# Patient Record
Sex: FEMALE | Race: WHITE
[De-identification: names, ages, dates, MRNs, and addresses within clinical notes are randomized per-mention and may not be internally consistent; named-entity substitution may affect disease eponyms.]

---

## 2019-12-11 ENCOUNTER — HOSPITAL ENCOUNTER (INPATIENT)
Dept: HOSPITAL 76 - ED | Age: 34
LOS: 2 days | Discharge: HOME | DRG: 138 | End: 2019-12-13
Attending: INTERNAL MEDICINE | Admitting: SPECIALIST
Payer: SELF-PAY

## 2019-12-11 DIAGNOSIS — R25.2: ICD-10-CM

## 2019-12-11 DIAGNOSIS — K04.7: ICD-10-CM

## 2019-12-11 DIAGNOSIS — Z97.5: ICD-10-CM

## 2019-12-11 DIAGNOSIS — K12.2: Primary | ICD-10-CM

## 2019-12-11 DIAGNOSIS — Z87.891: ICD-10-CM

## 2019-12-11 DIAGNOSIS — R59.0: ICD-10-CM

## 2019-12-11 DIAGNOSIS — K04.1: ICD-10-CM

## 2019-12-11 LAB
ANION GAP SERPL CALCULATED.4IONS-SCNC: 13 MMOL/L (ref 6–13)
BASOPHILS NFR BLD AUTO: 0.1 10^3/UL (ref 0–0.1)
BASOPHILS NFR BLD AUTO: 0.4 %
BUN SERPL-MCNC: 9 MG/DL (ref 6–20)
CALCIUM UR-MCNC: 9.2 MG/DL (ref 8.5–10.3)
CHLORIDE SERPL-SCNC: 102 MMOL/L (ref 101–111)
CO2 SERPL-SCNC: 23 MMOL/L (ref 21–32)
CREAT SERPLBLD-SCNC: 0.6 MG/DL (ref 0.4–1)
EOSINOPHIL # BLD AUTO: 0.1 10^3/UL (ref 0–0.7)
EOSINOPHIL NFR BLD AUTO: 0.3 %
ERYTHROCYTE [DISTWIDTH] IN BLOOD BY AUTOMATED COUNT: 12.1 % (ref 12–15)
GFRSERPLBLD MDRD-ARVRAT: 115 ML/MIN/{1.73_M2} (ref 89–?)
GLUCOSE SERPL-MCNC: 90 MG/DL (ref 70–100)
HCG UR QL: NEGATIVE
HGB UR QL STRIP: 13.6 G/DL (ref 12–16)
LYMPHOCYTES # SPEC AUTO: 1.7 10^3/UL (ref 1.5–3.5)
LYMPHOCYTES NFR BLD AUTO: 10.3 %
MCH RBC QN AUTO: 32.3 PG (ref 27–31)
MCHC RBC AUTO-ENTMCNC: 33.4 G/DL (ref 32–36)
MCV RBC AUTO: 96.7 FL (ref 81–99)
MONOCYTES # BLD AUTO: 1 10^3/UL (ref 0–1)
MONOCYTES NFR BLD AUTO: 6.1 %
NEUTROPHILS # BLD AUTO: 13.2 10^3/UL (ref 1.5–6.6)
NEUTROPHILS # SNV AUTO: 16 X10^3/UL (ref 4.8–10.8)
NEUTROPHILS NFR BLD AUTO: 82.2 %
PDW BLD AUTO: 10 FL (ref 7.9–10.8)
PLATELET # BLD: 298 10^3/UL (ref 130–450)
RBC MAR: 4.21 10^6/UL (ref 4.2–5.4)
SODIUM SERPLBLD-SCNC: 138 MMOL/L (ref 135–145)
SP GR UR STRIP.AUTO: 1.02 (ref 1–1.03)

## 2019-12-11 PROCEDURE — 70491 CT SOFT TISSUE NECK W/DYE: CPT

## 2019-12-11 PROCEDURE — 0CDXXZ0 EXTRACTION OF LOWER TOOTH, SINGLE, EXTERNAL APPROACH: ICD-10-PCS | Performed by: DENTIST

## 2019-12-11 PROCEDURE — 81025 URINE PREGNANCY TEST: CPT

## 2019-12-11 PROCEDURE — 96365 THER/PROPH/DIAG IV INF INIT: CPT

## 2019-12-11 PROCEDURE — 0J910ZZ DRAINAGE OF FACE SUBCUTANEOUS TISSUE AND FASCIA, OPEN APPROACH: ICD-10-PCS | Performed by: DENTIST

## 2019-12-11 PROCEDURE — 99285 EMERGENCY DEPT VISIT HI MDM: CPT

## 2019-12-11 PROCEDURE — 96375 TX/PRO/DX INJ NEW DRUG ADDON: CPT

## 2019-12-11 PROCEDURE — 87070 CULTURE OTHR SPECIMN AEROBIC: CPT

## 2019-12-11 PROCEDURE — 85025 COMPLETE CBC W/AUTO DIFF WBC: CPT

## 2019-12-11 PROCEDURE — 87205 SMEAR GRAM STAIN: CPT

## 2019-12-11 PROCEDURE — 36415 COLL VENOUS BLD VENIPUNCTURE: CPT

## 2019-12-11 PROCEDURE — 80048 BASIC METABOLIC PNL TOTAL CA: CPT

## 2019-12-11 PROCEDURE — 99284 EMERGENCY DEPT VISIT MOD MDM: CPT

## 2019-12-11 RX ADMIN — ACETAMINOPHEN PRN MG: 325 TABLET ORAL at 20:47

## 2019-12-11 RX ADMIN — MORPHINE SULFATE PRN MG: 2 INJECTION, SOLUTION INTRAMUSCULAR; INTRAVENOUS at 19:03

## 2019-12-11 RX ADMIN — SODIUM CHLORIDE SCH MLS/HR: 9 INJECTION, SOLUTION INTRAVENOUS at 23:17

## 2019-12-11 RX ADMIN — SODIUM CHLORIDE SCH MLS/HR: 9 INJECTION, SOLUTION INTRAVENOUS at 19:04

## 2019-12-11 RX ADMIN — SODIUM CHLORIDE SCH: 9 INJECTION, SOLUTION INTRAVENOUS at 22:27

## 2019-12-11 RX ADMIN — SODIUM CHLORIDE, PRESERVATIVE FREE SCH ML: 5 INJECTION INTRAVENOUS at 19:03

## 2019-12-11 NOTE — ED PHYSICIAN DOCUMENTATION
PD HPI HEENT





- Stated complaint


Stated Complaint: MOUTH PX/SENT BY 





- Chief complaint


Chief Complaint: Heent





- History obtained from


History obtained from: Patient





- History of Present Illness


Timing - onset: How many days ago (2-3)


Timing - duration: Days (2-3)


Timing - details: Abrupt onset


Severity Comments: moderate to severe 


Location: Other (Right facial swelling, mouth pain, dental pain)


Improves: Nothing


Worsens: Other (palpation, opening her mouth)


Associated symptoms: Trismus, Facial swelling.  No: Fever, Congestion


Similar symptoms before: Has not had sx before


Recently seen: Clinic (was seen in ENT clinic this morning and referred to the 

ED for labs, IV, and CT imaging evaluation)





- Treatment prior to arrival


Treatment prior to arrival: 





tylenol earlier today





Review of Systems


Ten Systems: 10 systems reviewed and negative


Constitutional: denies: Fever, Chills


Ears: reports: Reviewed and negative


Nose: reports: Reviewed and negative


Throat: reports: Dental pain / toothache


GI: denies: Nausea, Vomiting


Skin: reports: Reviewed and negative


Musculoskeletal: denies: Neck pain


Neurologic: reports: Reviewed and negative.  denies: Headache


Psychiatric: reports: Reviewed and negative


Endocrine: reports: Reviewed and negative


Immunocompromised: reports: Reviewed and negative





PD PAST MEDICAL HISTORY





- Past Medical History


Past Medical History: No





- Allergies


Allergies/Adverse Reactions: 


                                    Allergies











Allergy/AdvReac Type Severity Reaction Status Date / Time


 


No Known Drug Allergies Allergy   Verified 12/11/19 14:14














PD ED PE NORMAL





- Vitals


Vital signs reviewed: Yes





- General


General: Alert and oriented X 3, No acute distress, Well developed/nourished





- HEENT


HEENT: Atraumatic





- Cardiac


Cardiac: RRR





- Respiratory


Respiratory: No respiratory distress





- Abdomen


Abdomen: Soft, Non distended





- Female 


Female : Deferred





- Rectal


Rectal: Deferred





- Derm


Derm: Normal color, Warm and dry, No rash





- Neuro


Neuro: Alert and oriented X 3


Eye Opening: Spontaneous


Motor: Obeys Commands


Verbal: Oriented


GCS Score: 15





- Psych


Psych: Normal mood, Normal affect





PD ED PE EXPANDED





- HEENT


HEENT: Other (Large amount of R jaw line swelling, mucous membranes moist, 

trismus present, unable to visualize inside of mouth. The sweling is firm and 

tender. no erythema)





- Neck


Neck: Other (Right submandibular swelling and tenderness that is moderate to 

severe)





Results





- Vitals


Vitals: 


                               Vital Signs - 24 hr











  12/11/19 12/11/19





  14:14 15:59


 


Temperature 37.2 C 


 


Heart Rate 88 80


 


Respiratory 18 18





Rate  


 


Blood Pressure 141/75 H 133/71 H


 


O2 Saturation 100 100








                                     Oxygen











O2 Source                      Room air

















- Labs


Labs: 


                                Laboratory Tests











  12/11/19 12/11/19





  14:14 14:14


 


WBC  16.0 H 


 


RBC  4.21 


 


Hgb  13.6 


 


Hct  40.7 


 


MCV  96.7 


 


MCH  32.3 H 


 


MCHC  33.4 


 


RDW  12.1 


 


Plt Count  298 


 


MPV  10.0 


 


Neut # (Auto)  13.2 H 


 


Lymph # (Auto)  1.7 


 


Mono # (Auto)  1.0 


 


Eos # (Auto)  0.1 


 


Baso # (Auto)  0.1 


 


Absolute Nucleated RBC  0.00 


 


Nucleated RBC %  0.0 


 


Sodium   138


 


Potassium   3.6


 


Chloride   102


 


Carbon Dioxide   23


 


Anion Gap   13.0


 


BUN   9


 


Creatinine   0.6


 


Estimated GFR (MDRD)   115


 


Glucose   90


 


Calcium   9.2














PD MEDICAL DECISION MAKING





- ED course


Complexity details: reviewed results, re-evaluated patient, considered 

differential, d/w patient, d/w consultant


ED course: 


ddx- submandibular abscess, treasure's angina, dental abscess





33 y/o  F with dental pain and worsening facial swelling for 2-3 days, afebrile 

but with obvious facial swelling and trismus.


Sent in to the ED by Dr. Mosher who will take her to the OR today for management. 


Obtained labs, CT scan and given antibiotics. 


pt does have a [id number] mm R submandibular abscess with a periapical mandibular 

tooth abscess needing operative management. 


SHe is currently maintaining her airway and Dr. Mosher is on the way into the ED 

to take her to the OR thus will hold off on intubation for OR unless she has 

worsening clinical symptoms. 


Hospitalist. will admit the pt to medicine with Dr. Mosher managing her 

surgically.





Departure





- Departure


Disposition: [facility] DC/Xfer


Clinical Impression: 


 Abscess or cellulitis of submandibular region





Condition: Serious


Record reviewed to determine appropriate education?: Yes

## 2019-12-11 NOTE — CT REPORT
Reason:  submanidublar abscess

Procedure Date:  12/11/2019   

Accession Number:  [id number] / [id number]                    

Procedure:  CT  - SOFT TISSUE NECK W CPT Code:  

 

***Final Report***

 

 

FULL RESULT:

 

 

EXAM:

CT SOFT TISSUE NECK WITH CONTRAST.

 

EXAM DATE: 12/11/2019 03:43 PM.

 

HISTORY: Right submandibular swelling. Concern for mass or abscess.

 

COMPARISONS: None.

 

TECHNIQUE: Routine soft tissue neck CT protocol with contrast. 

Reconstructions: Coronal and sagittal. IV contrast: Optiray 320, 80 mL.

 

In accordance with CT protocol optimization, one or more of the following 

dose reduction techniques were utilized for this exam: automated exposure 

control, adjustment of mA and/or KV based on patient size, or use of 

iterative reconstructive technique.

 

FINDINGS:

Visualized Intracranial Contents: Unremarkable.

 

Orbits: Symmetric and unremarkable.

 

Sinuses: Visualized paranasal sinuses and mastoid air cells are clear.

 

Oral cavity: The visualized oral cavity is unremarkable. Mild effacement 

of the right base of the tongue is seen secondary to submandibular space 

abscess and surrounding inflammation. No intrinsic tongue density 

abnormality is appreciated.

 

Pharynx: Mild effacement of the right vallecula is seen. Otherwise 

pharyngeal mucosa is unremarkable. The  spaces and 

pterygopalatine fossa are unremarkable. The infratemporal fossa, 

parapharyngeal spaces, and retropharyngeal space are unremarkable. The 

base of the tongue is symmetric and unremarkable. The airway is patent.

 

Larynx: Larynx and supraglottic airway are patent without mass lesion. 

Vocal cords are symmetric. The visualized trachea is unremarkable.

 

Parotid and Submandibular Glands: Extrinsic mass effect with inferior and 

anteromedial displacement of the right submandibular gland is 

appreciated. No definite intrinsic signal abnormality is seen. No 

calculus is evident.

The left submandibular gland and bilateral parotid glands are 

unremarkable.

 

Lymph Nodes: Enlarged right level I and level II lymph nodes are present. 

No hypodensity is seen to suggest lymph node abscess.

 

Soft Tissues: Rim-enhancing hypodense collection consistent with abscess 

is seen along the inferior margin of the posterior body and angle of the 

mandible. This measures 15 x 28 x 24 mm. There is adjacent periapical 

abscess involving right mandibular posterior tricuspid tooth. Medial 

cortical erosion is present. Caries is seen within this tooth as well. 

Surrounding fascial plane edema is seen centered in the right 

submandibular space. Lateral displacement and thickening of the right 

platysma muscle is seen. Mild fascial plane edema is seen superficial to 

the right masseter muscle and inferior parotid gland. Mild fascial plane 

edema extends inferiorly into the anterior cervical space.

 

Vascular Structures: Patent and unremarkable.

 

Thyroid Gland: Normal.

 

Lung: The visualized lung apices are clear.

 

Bones: Mild kyphosis of the cervical spine is noted. No lytic or blastic 

bony lesions are seen. Minimal spondylosis with dorsal disk bulge is seen 

at C4-C5 and C5-C6.

 

Other: None.

IMPRESSION:

1. Periapical abscess involving right mandibular posterior tricuspid 

tooth. Medial cortical erosion is seen. Adjacent abscess is present 

inferior to the angle of the mandible measuring 15 x 28 x 24 mm. 

Surrounding fascial plane edema and mass effect is present.

 

2. Reactive lymphadenopathy is seen involving right level I and level II 

lymph nodes. No lymph node abscess is seen.

 

RADIA

 

The call report notification system was initiated by Dr. Jayson Perez at 

04:14 PM on 12/11/2019.

 

The above call report findings  were discussed with Romana Dorsey by Dr. Jayson Perez at 04:22 PM on 12/11/2019.

## 2019-12-11 NOTE — ANESTHESIA
Pre-Anesthesia VS, & Labs





- Diagnosis





R jaw swelling abcess





- Procedure





R Jaw I&D


Vital Signs: 





                                        











Temp Pulse Resp BP Pulse Ox


 


 37.2 C   80   18   133/71 H  100 


 


 12/11/19 14:14  12/11/19 15:59  12/11/19 15:59  12/11/19 15:59  12/11/19 15:59














                                        





Height                           5 ft 7 in


Weight (kg)                      69.853 kg


Body Mass Index                  24.1











- NPO


>8 hours





- Pregnancy


Is Patient Pregnant?: Waiver signed





- Lab Results


Current Lab Results: 





Laboratory Tests





12/11/19 14:14: Sodium 138, Potassium 3.6, Chloride 102, Carbon Dioxide 23, 

Anion Gap 13.0, BUN 9, Creatinine 0.6, Estimated GFR (MDRD) 115, Glucose 90, 

Calcium 9.2


12/11/19 14:14: WBC 16.0 H, RBC 4.21, Hgb 13.6, Hct 40.7, MCV 96.7, MCH 32.3 H, 

MCHC 33.4, RDW 12.1, Plt Count 298, MPV 10.0, Neut # (Auto) 13.2 H, Lymph # 

(Auto) 1.7, Mono # (Auto) 1.0, Eos # (Auto) 0.1, Baso # (Auto) 0.1, Absolute 

Nucleated RBC 0.00, Nucleated RBC % 0.0








Lab results reviewed: Yes


Fish Bones: 


                                 12/11/19 14:14





                                 12/11/19 14:14





Home Medications and Allergies





Active Medications





Sodium Chloride (Normal Saline 0.9%)  1,000 mls @ 100 mls/hr IV .Q10H UTE


Ampicillin Sodium/Sulbactam (Sodium 3 gm/ Sodium Chloride)  100 mls @ 200 mls/hr

IV Q6HR UTE


Morphine Sulfate (Morphine (Carpuject))  2 mg IVP Q2HR PRN


   PRN Reason: Pain 8 to 10


Ondansetron HCl (Zofran Inj)  4 mg IVP Q6HR PRN


   PRN Reason: Nausea / Vomiting


Ondansetron HCl (Zofran Odt)  4 mg TL Q6HR PRN


   PRN Reason: Nausea / Vomiting


Sodium Chloride (Normal Saline Flush 0.9%)  10 ml IVP PRN PRN


   PRN Reason: AS NEEDED PER PROVIDER ORDERS


Sodium Chloride (Normal Saline Flush 0.9%)  10 ml IVP 0100,0900,1700 [facility]








Allergies/Adverse Reactions: 


                                    Allergies











Allergy/AdvReac Type Severity Reaction Status Date / Time


 


No Known Drug Allergies Allergy   Verified 12/11/19 14:14














Anes History & Medical History





- Anesthetic History


Anesthesia Complications: reports: No previous complications


Family history of Anesthesia Complications: Denies


Family history of Malignant Hyperthermia: Denies





- Medical History


Cardiovascular: reports: None


Pulmonary: reports: None


Gastrointestinal: reports: None


Urinary: reports: None


Neuro: reports: None


Musculoskeletal: reports: None


Endocrine/Autoimmune: reports: None


Blood Disorders: reports: None


Smoking Status: Never smoker


Psychosocial: reports: Cannabis





- Surgical History


Orthopedic: Other (wrist)





Exam


General: Alert, Oriented x3, Cooperative


Dental: WNL (#7 at front chipped)


Neck Mobility: Normal


Mallampati classification: III (normally no difficulty with mouth opening.)


Thyromental Distance: 4-6 cm


Respiratory: Lungs clear, Normal breath sounds


Cardiovascular: Regular rate


Neurological: Normal speech


Mental/Cognitive Status: Alert/Oriented X3, Normal for patient


Cognitive Status: Within normal limits





Plan


Anesthesia Type: General


Consent for Procedure(s) Verified and Reviewed: Yes


Code Status: Attempt Resuscitation


ASA classification: 2-Mild systemic disease


Is this case an emergency?: Yes

## 2019-12-11 NOTE — HISTORY & PHYSICAL EXAMINATION
Chief Complaint





- Chief Complaint


Chief Complaint: right jaw pain/swelling





History of Present Illness





- Admitted From


Admitted From:: Home/ER





- History Obtained From


Records Reviewed: [facility]


History obtained from: patient, mom


Exam Limitations: none





- History of Present Illness


HPI Comment/Other: 


She is a 34-year-old white female who has no major medical illnesses the 

presents to our emergency room because of jaw pain and swelling.She says that 

she has this intermittently because of a wisdom tooth on the right side of her 

jaw that will have growth spurts.  Usually pain is transitory, fleeting, and 

does not last for very long.  And only her gum will get swollen.





This time the swelling has extended into her jaw and into her face and down her 

neck and it is scaring her.She went to go see the dentist, who referred her to 

the emergency room for imaging studies.  She has been identified as having a 

tooth abscess extending into the jaw.On review of systems she has no problems 

with chest pain, previous history of valvular heart disease, arrhythmias, 

pneumonias or bronchitis, urgency, frequency, dysuria.She does not take any 

medication on a regular basis.She has an IUD for birth control.





She does not have a fever but white cell count is 16,000.Soft tissue neck CT 

shows apparent a couple abscess involving the right mandibular posterior 

tricuspid tooth.  Medial cortical erosion seen with adjacent abscess present to 

the inferior of the angle of the mandible measuring 15 x 28 x 24 mm.  

Surrounding fascial plane edema and mass-effect is present.











History





- Past Medical History


Cardiovascular: reports: None


Respiratory: reports: None


Neuro: reports: None


Endocrine/Autoimmune: reports: None


GI: reports: None


GYN: reports: Other (, has IUD)


: reports: None


HEENT: reports: Chronic vision loss


Psych: reports: Anxiety (that she treats w yoga, meditation, eating well, 

sleeping well and occ cannibus)


Musculoskeletal: reports: None


MRSA Hx?: No





- Past Surgical History


Ortho: reports: Other (wrist surgery for extra cartilage)





- Family & Social History


Family History Comment/Other: Mom is 63 and completely healthy without any high 

blood pressure, diabetes, thyroid disease, bleeding disorder, cancer.  Dad is 57

years and also completely healthy.  1 sister is completely healthy.  Never had 

children


Living arrangement: At home


Living Situation: Alone


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  She is

been  once and  in the last 5 years.She used to smoke 3 

cigarettes a day And stopped 5 years ago After a 10-year history.She drinks 

about 2-3 beers on Friday and Saturday. She does cannabis here and there.No 

history of other recreational substance abuse.Last use of smoking cannibus 3 

days ago.





- Substance History


Use: Uses substance without health or social issues: Cannabis


Abuse: Recurrent use of substance despite neg consequences: NONE


Dependence: Experiences withdrawal or developed tolerances: NONE





Meds/Allgy





- Allergies


Allergies/Adverse Reactions: 


                                    Allergies











Allergy/AdvReac Type Severity Reaction Status Date / Time


 


No Known Drug Allergies Allergy   Verified 19 14:14














Review of Systems





- Constitutional


Constitutional: reports: Poor appetite.  denies: Fatigue, Fever, Chills, Malaise





- Eyes


Eyes: denies: Pain, Irritation, Amaurosis, Blurred vision, Field loss, Vision 

loss





- Ears, Nose & Throat


Ears, Nose & Throat: reports: Ear pain (right), Sore throat (right), Bleeding 

gums (right), Dental decay (right), Dental pain (right), Other (swelling of face

and neck for 2-3 days)





- Cardiovascular


Cariovascular: denies: Irregular heart rate, Palpitations, Chest pain, Edema, 

Exertional dyspnea, Decr. exercise tolerance





- Respiratory


Respiratory: denies: Cough, Sputum production, Wheezing, Hemoptysis, Orthopnea, 

SOB at rest, SOB with exertion





- Gastrointestinal


Gastrointestinal: denies: Abdominal pain, Abdominal distention, Constipation, Di

arrhea, Change in bowel habits





- Genitourinary


Genitourinary: denies: Dysuria, Frequency, Urgency, Hematuria





- Musculoskeletal


Musculoskeletal: denies: Muscle pain, Back pain, Muscle aches, Stiffness





- Integumentary


Integumentary: denies: Rash, Pruritis, Acne





- Neurological


Neurological: denies: General weakness, Focal weakness, Headache, Dizziness





- Psychiatric


Psychiatric: denies: Depression, Anxiety, Suicidal





- Endocrine


Endocrine: denies: Polyuria, Polydypsia, Polyphagia, Intolerance to cold





- Hematologic/Lymphatic


Hematologic/Lymphatic: denies: Anemia, Bruising, Petechiae, Bleeding tendencies


Prior Level of Functionality: 





completely dependent with living alone, driving, paying bills, engaged to be 

, full time employed.





Exam





- Vital Signs


Reviewed Vital Signs: Yes


Vital Signs: 





                                Vital Signs x48h











  Temp Pulse Resp BP Pulse Ox


 


 19 15:59   80  18  133/71 H  100


 


 19 14:14  37.2 C  88  18  141/75 H  100














- Physical Exam


General Appearance: positive: Alert, Moderate distress, Other (mom is at the 

bedside)


Eyes Bilateral: positive: PERRL, EOMI


ENT: positive: Other (right gum line along back molar turgid, painful w edema of

cheek on that side)


Neck: positive: No JVD, Trachea midline, Swelling/bruising (starts along right 

TMJ, extends down neck with some redness of skin), Other (no stridor, no 

drooling)


Respiratory: positive: Chest non-tender, No respiratory distress, Other (no 

stridor).  negative: Wheezes, Rales, Rhonchi


Cardiovascular: positive: Regular rate & rhythm.  negative: Systolic murmur, 

Gallop/S4, Friction rub


Peripheral Pulses: positive: 1+


Abdomen: positive: Non-tender, No organomegaly, Nml bowel sounds, No distention


Skin: positive: Warm, Dry.  negative: Diaphoresis, Pallor


Extremities: positive: Non-tender, Full ROM.  negative: Nml appearance (right w

rist has scar and slight deformit at base of thumb)


Neurologic/Psychiatric: positive: Oriented x3, CN's nml (2-12), Motor nml, 

Sensation nml





Conclusion/Plan





- Problem List


(1) Abscess or cellulitis of submandibular region


Conclusion/Plan: 


she has already received unasyn which will continue for the next 48 hours.


oral maxillofacial surgery has been contacted and he has asked that patient be 

admitted to our service. [facility]


pain managment with IV tylenol, toradol, morphine until she can eat. 








(2) Pre-op evaluation


Conclusion/Plan: 


.Surgical risk calculator has 5.5% risk of serious complication, 6.3% risk of 

any complication.  She has a 0% risk of cardiac complication . Predicted length 

of hospital stay is 2.5 days








- Lab Results


Lab results reviewed: Yes


Fish Bones: 


                                 19 14:14





                                 19 14:14





- Diagnostic Imaging Results


Diagnostic Imaging Results: positive: Final report reviewed





Core Measures





- Anticipated LOS


I expect patient to be DC'd or transferred within 96 hours.: Yes





- DVT/VTE - Prophylaxis


VTE/DVT Device ordered at admit?: Yes

## 2019-12-12 LAB
BASOPHILS NFR BLD AUTO: 0 10^3/UL (ref 0–0.1)
BASOPHILS NFR BLD AUTO: 0.2 %
EOSINOPHIL # BLD AUTO: 0 10^3/UL (ref 0–0.7)
EOSINOPHIL NFR BLD AUTO: 0 %
ERYTHROCYTE [DISTWIDTH] IN BLOOD BY AUTOMATED COUNT: 12 % (ref 12–15)
HGB UR QL STRIP: 12.3 G/DL (ref 12–16)
LYMPHOCYTES # SPEC AUTO: 0.7 10^3/UL (ref 1.5–3.5)
LYMPHOCYTES NFR BLD AUTO: 4.3 %
MCH RBC QN AUTO: 31.4 PG (ref 27–31)
MCHC RBC AUTO-ENTMCNC: 32.5 G/DL (ref 32–36)
MCV RBC AUTO: 96.7 FL (ref 81–99)
MONOCYTES # BLD AUTO: 0.3 10^3/UL (ref 0–1)
MONOCYTES NFR BLD AUTO: 1.8 %
NEUTROPHILS # BLD AUTO: 15.1 10^3/UL (ref 1.5–6.6)
NEUTROPHILS # SNV AUTO: 16.2 X10^3/UL (ref 4.8–10.8)
NEUTROPHILS NFR BLD AUTO: 92.9 %
PDW BLD AUTO: 10.5 FL (ref 7.9–10.8)
PLATELET # BLD: 293 10^3/UL (ref 130–450)
RBC MAR: 3.92 10^6/UL (ref 4.2–5.4)

## 2019-12-12 RX ADMIN — MORPHINE SULFATE PRN MG: 2 INJECTION, SOLUTION INTRAMUSCULAR; INTRAVENOUS at 07:08

## 2019-12-12 RX ADMIN — SODIUM CHLORIDE SCH MLS/HR: 9 INJECTION, SOLUTION INTRAVENOUS at 09:22

## 2019-12-12 RX ADMIN — MORPHINE SULFATE PRN MG: 2 INJECTION, SOLUTION INTRAMUSCULAR; INTRAVENOUS at 11:15

## 2019-12-12 RX ADMIN — SODIUM CHLORIDE SCH MLS/HR: 9 INJECTION, SOLUTION INTRAVENOUS at 21:06

## 2019-12-12 RX ADMIN — Medication SCH CAP: at 13:50

## 2019-12-12 RX ADMIN — SODIUM CHLORIDE, PRESERVATIVE FREE SCH ML: 5 INJECTION INTRAVENOUS at 00:25

## 2019-12-12 RX ADMIN — SODIUM CHLORIDE, PRESERVATIVE FREE SCH ML: 5 INJECTION INTRAVENOUS at 07:08

## 2019-12-12 RX ADMIN — SODIUM CHLORIDE SCH MLS/HR: 9 INJECTION, SOLUTION INTRAVENOUS at 17:49

## 2019-12-12 RX ADMIN — SODIUM CHLORIDE SCH MLS/HR: 9 INJECTION, SOLUTION INTRAVENOUS at 23:50

## 2019-12-12 RX ADMIN — SODIUM CHLORIDE SCH MLS/HR: 9 INJECTION, SOLUTION INTRAVENOUS at 05:59

## 2019-12-12 RX ADMIN — SODIUM CHLORIDE, PRESERVATIVE FREE SCH ML: 5 INJECTION INTRAVENOUS at 23:51

## 2019-12-12 RX ADMIN — SODIUM CHLORIDE SCH MLS/HR: 9 INJECTION, SOLUTION INTRAVENOUS at 11:52

## 2019-12-12 RX ADMIN — MORPHINE SULFATE PRN MG: 2 INJECTION, SOLUTION INTRAMUSCULAR; INTRAVENOUS at 00:24

## 2019-12-12 RX ADMIN — MORPHINE SULFATE PRN MG: 2 INJECTION, SOLUTION INTRAMUSCULAR; INTRAVENOUS at 18:31

## 2019-12-12 RX ADMIN — SODIUM CHLORIDE, PRESERVATIVE FREE SCH: 5 INJECTION INTRAVENOUS at 17:05

## 2019-12-12 NOTE — PHARMACY PROGRESS NOTE
- Best Possible Medication History


Admit Date and Time: 12/11/19 1644


Processed by: Pharmacy


Medication History completed: Yes (Per pt: no established PCP. No PCP noted on 

patient's profile as well.)


Patient Interview: Completed


Secondary Source(s): Insurance records (external med history report yielded no 

med fill history)





As the person ultimately responsible for medication therapy, providers are able 

to order a medication from an existing home medication list in [facility] via the 

"Reconcile Routine" prior to Confirmation of that medication by support staff. 

Such practice is discouraged except when the physician, in their clinical 

judgment, deems that a medical need exists for a medication without regard to 

previous use.

## 2019-12-12 NOTE — PROVIDER PROGRESS NOTE
Subjective





- Prog Note Date


Prog Note Date: 12/12/19


Prog Note Time: 13:48





- Subjective


Pt reports feeling: Improved


Subjective: 


No events overnight.  Feeling much better.  Less pain.  Mouth opening markedly 

improved.  


Ambulating and voiding w/out difficulty.  MInimal pain med needs.  Small PO 

intake still. 





Objective





- Vital Signs/Intake & Output


Reviewed Vital Signs: Yes


Vital Signs: 


                                Vital Signs x48h











  Temp Pulse Resp BP Pulse Ox


 


 12/12/19 10:00  37.0 C  88  16  114/66  96


 


 12/12/19 07:30  37 C  88  16  114/66  96


 


 12/12/19 05:54  36.9 C  91  16  123/63  97











Intake & Output: 


                                 Intake & Output











 12/09/19 12/10/19 12/11/19 12/12/19





 23:59 23:59 23:59 23:59


 


Intake Total   4249.797 5039


 


Output Total    3500


 


Balance   1521.667 -2060














- Objective


General Appearance: positive: No acute distress


Eyes Bilateral: positive: PERRL, EOMI


ENT: positive: Other (VIDAL 12mm.  No bleeding or drainage.  MOderate postop 

edema.  R neck and face remain swollen and indurated with only mild improvement 

since yesterday.  Drain intact.  Moderate serosanguinous draiange, no purlence.)





- Lab Results


Fish Bones: 


                                 12/12/19 04:50





                                 12/11/19 14:14


Other Labs: 


                               Lab Results x24hrs











  12/12/19 12/11/19 12/11/19 Range/Units





  04:50 18:55 14:14 


 


WBC  16.2 H    (4.8-10.8)  x10^3/uL


 


RBC  3.92 L    (4.20-5.40)  10^6/uL


 


Hgb  12.3    (12.0-16.0)  g/dL


 


Hct  37.9    (37.0-47.0)  %


 


MCV  96.7    (81.0-99.0)  fL


 


MCH  31.4 H    (27.0-31.0)  pg


 


MCHC  32.5    (32.0-36.0)  g/dL


 


RDW  12.0    (12.0-15.0)  %


 


Plt Count  293    (130-450)  10^3/uL


 


MPV  10.5    (7.9-10.8)  fL


 


Neut # (Auto)  15.1 H    (1.5-6.6)  10^3/uL


 


Lymph # (Auto)  0.7 L    (1.5-3.5)  10^3/uL


 


Mono # (Auto)  0.3    (0.0-1.0)  10^3/uL


 


Eos # (Auto)  0.0    (0.0-0.7)  10^3/uL


 


Baso # (Auto)  0.0    (0.0-0.1)  10^3/uL


 


Absolute Nucleated RBC  0.00    x10^3/uL


 


Nucleated RBC %  0.0    /100WBC


 


Sodium    138  (135-145)  mmol/L


 


Potassium    3.6  (3.5-5.0)  mmol/L


 


Chloride    102  (101-111)  mmol/L


 


Carbon Dioxide    23  (21-32)  mmol/L


 


Anion Gap    13.0  (6-13)  


 


BUN    9  (6-20)  mg/dL


 


Creatinine    0.6  (0.4-1.0)  mg/dL


 


Estimated GFR (MDRD)    115  (>89)  


 


Glucose    90  ()  mg/dL


 


Calcium    9.2  (8.5-10.3)  mg/dL


 


Ur Specific Gravity   1.025   (1.002-1.030)  


 


Urine HCG, Qual   NEGATIVE   














  12/11/19 Range/Units





  14:14 


 


WBC  16.0 H  (4.8-10.8)  x10^3/uL


 


RBC  4.21  (4.20-5.40)  10^6/uL


 


Hgb  13.6  (12.0-16.0)  g/dL


 


Hct  40.7  (37.0-47.0)  %


 


MCV  96.7  (81.0-99.0)  fL


 


MCH  32.3 H  (27.0-31.0)  pg


 


MCHC  33.4  (32.0-36.0)  g/dL


 


RDW  12.1  (12.0-15.0)  %


 


Plt Count  298  (130-450)  10^3/uL


 


MPV  10.0  (7.9-10.8)  fL


 


Neut # (Auto)  13.2 H  (1.5-6.6)  10^3/uL


 


Lymph # (Auto)  1.7  (1.5-3.5)  10^3/uL


 


Mono # (Auto)  1.0  (0.0-1.0)  10^3/uL


 


Eos # (Auto)  0.1  (0.0-0.7)  10^3/uL


 


Baso # (Auto)  0.1  (0.0-0.1)  10^3/uL


 


Absolute Nucleated RBC  0.00  x10^3/uL


 


Nucleated RBC %  0.0  /100WBC


 


Sodium   (135-145)  mmol/L


 


Potassium   (3.5-5.0)  mmol/L


 


Chloride   (101-111)  mmol/L


 


Carbon Dioxide   (21-32)  mmol/L


 


Anion Gap   (6-13)  


 


BUN   (6-20)  mg/dL


 


Creatinine   (0.4-1.0)  mg/dL


 


Estimated GFR (MDRD)   (>89)  


 


Glucose   ()  mg/dL


 


Calcium   (8.5-10.3)  mg/dL


 


Ur Specific Gravity   (1.002-1.030)  


 


Urine HCG, Qual   














Assessment/Plan





- Problem List


(1) Abscess or cellulitis of submandibular region


Impression: 


33 yo F POD #1 s/p I&D of the R submandibular, sublingual, and buccal spp w/ 

removal of tooth #32 following a normal postop course.





  - VIDAL 7 -->12


  - WBC 16 --> 16.2


 


Plan:  


  - continue iv abx


  - Jaw stretching exercises reviewed with the patient


  - continue drain


  - dressing change BID and prn


  - encourage ambulation and good PO intake.


  - d/c in am pending improvement in clinical picture (swelling, trismus, white 

count)

## 2019-12-12 NOTE — OPERATIVE REPORT
DATE OF SERVICE: 12/11/2019

Physician: Mauro Linda DDS

 

PREOPERATIVE DIAGNOSIS:  Right buccal space, submandibular space and sublingual space abscess seconda
ry to necrotic tooth #32.

 

POSTOPERATIVE DIAGNOSIS:  Right buccal space, submandibular space and sublingual space abscess second
johanne to necrotic tooth #32.

 

PROCEDURE PERFORMED:  Extraoral incision and drainage of the right buccal submandibular and sublingua
l spaces with surgical extraction of tooth #32.

 

PRIMARY SURGEON:  Mauro Linda DDS

 

FIRST ASSISTANT:  Vesna

 

ANESTHESIA TYPE:  General anesthesia via oral endotracheal intubation.

 

ANESTHETIST:  Jacinto

 

SPECIMENS:  A culture swab of the right neck purulence was sent to Micro for Gram stain, aerobic and 
anaerobic culture.

 

DRAINS, PACKS, CATHETERS:  A 1/4 inch Penrose was placed in the right neck.

 

COMPLICATIONS:  None.

 

ESTIMATED BLOOD LOSS:  10 mL

 

INDICATIONS FOR SURGERY:  This is a 34-year-old female who presented to my office for right facial sw
elling.  Clinical and radiographic examination was consistent with an abscess of the right buccal sub
mandibular and sublingual spaces with severe trismus and pain.  It was determined that drainage of th
e abscess with removal of the offending tooth was necessary.  The risks, benefits and alternatives of
 this plan were discussed with the patient including pain, swelling, bleeding, infection, damage to a
djacent teeth, damage to nerves, scarring, poor cosmesis, need for further surgery, paralysis of the 
muscles of the face, numbness of the muscles of the face, specifically the lip, chin or tongue and re
currence of the infection.  Adequate time was given to answer all questions and informed consent was 
obtained.

 

DESCRIPTION OF PROCEDURE:  The patient was brought to the main operating room and placed in a supine 
position on the operating table.  General anesthesia was induced by the anesthesia team and the airwa
y was secured with an oral endotracheal tube taped to the left side of the face.  All pressure points
 were padded and checked.  The patient was prepped and draped in the standard sterile fashion for an 
intraoral and extraoral surgical procedure.  A formal timeout was executed.  Local anesthesia was ach
ieved with 7 mL of 2% lidocaine with 1:100,000 epinephrine.  Attention was directed to the mouth, whe
re a throat pack was placed.  Attention was then directed to the right neck, where a #15 blade was us
ed to make a 1.5 cm incision in a skin fold.  A curved Krysten was then used to dissect bluntly from th
e incision, which was only through skin up to the right inferior border of the mandible.  At this poi
nt, it was felt to easily drop into the abscess and purulence began to flow from the neck incision.  
The purulence was sampled and the sample was sent to Microbiology.  The buccal space was explored.  T
he submandibular space was explored and the sublingual space was explored with the curved jacklyn Bashir care to explore all aspects of the abscess cavity.  The area was massaged by hand until all purule
nce could be expressed and then it was irrigated copiously with approximately 500 mL of sterile salin
e.  We stopped suddenly when we realized that there was increasing swelling of the right buccal space
, likely secondary to irrigation infiltration into the skin, but by this time adequate irrigation had
 been performed.  Attention was directed into the mouth, toward the right mandible.  The tooth was re
moved with an elevator and forceps.  The site was curetted and irrigated.  Attention was directed gorge
k to the right neck.  A 1/4 inch Penrose drain was placed into the right neck and up into the submand
ibular space.  It was secured into place with a 2-0 silk suture.  The patient's face and mouth were t
hen cleansed.  The Tegaderm was removed from the eyes.  The throat pack was removed.  The mouth was d
ressed with a 4 x 4 gauze and the right neck was dressed with clean gauze as well.  Care of the patie
nt was returned to the anesthesia team for uneventful emergence from anesthesia, extubation and trans
ferred to the PACU with the patient in stable condition.

 

 

DD: 12/11/2019 22:42

TD: 12/11/2019 22:43

Job #: [id number]

## 2019-12-12 NOTE — PROVIDER PROGRESS NOTE
Subjective





- Prog Note Date


Prog Note Date: 12/12/19


Prog Note Time: 09:24





- Subjective


Subjective: 


She underwent the surgery last night.  Today her jaw is stiff.  She can barely 

open it.  Is hard to eat.  But she is been drinking plenty of fluids.  Pain is 

controlled with the morphine.  No fever spike but her white cell count is still 

16,000.








Current Medications





- Current Medications


Current Medications: 





Active Medications





Acetaminophen (Tylenol)  650 mg PO Q4HR PRN


   PRN Reason: Pain or Fever > 38C (100.4F)


   Last Admin: 12/11/19 20:47 Dose:  650 mg


Sodium Chloride (Normal Saline 0.9%)  1,000 mls @ 100 mls/hr IV .Q10H [facility]


   Last Admin: 12/12/19 09:22 Dose:  100 mls/hr


Ampicillin Sodium/Sulbactam (Sodium 3 gm/ Sodium Chloride)  100 mls @ 200 mls/hr

IV Q6HR [facility]


   Last Infusion: 12/12/19 07:09 Dose:  Infused


Morphine Sulfate (Morphine (Carpuject))  2 mg IVP Q2HR PRN


   PRN Reason: Pain 8 to 10


   Last Admin: 12/12/19 07:08 Dose:  2 mg


Ondansetron HCl (Zofran Inj)  4 mg IVP Q6HR PRN


   PRN Reason: Nausea / Vomiting


Ondansetron HCl (Zofran Odt)  4 mg TL Q6HR PRN


   PRN Reason: Nausea / Vomiting


Sodium Chloride (Normal Saline Flush 0.9%)  10 ml IVP PRN PRN


   PRN Reason: AS NEEDED PER PROVIDER ORDERS


Sodium Chloride (Normal Saline Flush 0.9%)  10 ml IVP 0100,0900,1700 [facility]


   Last Admin: 12/12/19 07:08 Dose:  10 ml











Objective





- Vital Signs/Intake & Output


Reviewed Vital Signs: Yes


Vital Signs: 


                                Vital Signs x48h











  Temp Pulse Resp BP Pulse Ox


 


 12/12/19 07:30  37 C  88  16  114/66  96


 


 12/12/19 05:54  36.9 C  91  16  123/63  97


 


 12/12/19 02:00  36.7 C  93  16  115/61  96











Intake & Output: 


                                 Intake & Output











 12/09/19 12/10/19 12/11/19 12/12/19





 23:59 23:59 23:59 23:59


 


Intake Total   4331.478 6760


 


Output Total    2700


 


Balance   1521.667 -1480














- Objective


General Appearance: positive: Alert


Eyes Bilateral: positive: PERRL, EOMI


ENT: positive: Other (The right side of her face is still swollen along her 

jawline, extending into her neck.  It is covered by bandage.  No redness no 

heat.)


Neck: positive: No JVD.  negative: Trachea midline, Stiff neck


Respiratory: positive: Chest non-tender.  negative: Wheezes, Rales, Rhonchi


Cardiovascular: positive: Regular rate & rhythm.  negative: Systolic murmur, 

Gallop/S4, Friction rub


Abdomen: positive: Non-tender, No organomegaly, Nml bowel sounds, No distention


Skin: positive: Warm, Dry


Extremities: positive: Full ROM, No pedal edema


Neurologic/Psychiatric: positive: Oriented x3, CN's nml (2-12), Motor nml, 

Sensation nml





- Lab Results


Fish Bones: 


                                 12/12/19 04:50





                                 12/11/19 14:14


Other Labs: 


                               Lab Results x24hrs











  12/12/19 12/11/19 12/11/19 Range/Units





  04:50 18:55 14:14 


 


WBC  16.2 H    (4.8-10.8)  x10^3/uL


 


RBC  3.92 L    (4.20-5.40)  10^6/uL


 


Hgb  12.3    (12.0-16.0)  g/dL


 


Hct  37.9    (37.0-47.0)  %


 


MCV  96.7    (81.0-99.0)  fL


 


MCH  31.4 H    (27.0-31.0)  pg


 


MCHC  32.5    (32.0-36.0)  g/dL


 


RDW  12.0    (12.0-15.0)  %


 


Plt Count  293    (130-450)  10^3/uL


 


MPV  10.5    (7.9-10.8)  fL


 


Neut # (Auto)  15.1 H    (1.5-6.6)  10^3/uL


 


Lymph # (Auto)  0.7 L    (1.5-3.5)  10^3/uL


 


Mono # (Auto)  0.3    (0.0-1.0)  10^3/uL


 


Eos # (Auto)  0.0    (0.0-0.7)  10^3/uL


 


Baso # (Auto)  0.0    (0.0-0.1)  10^3/uL


 


Absolute Nucleated RBC  0.00    x10^3/uL


 


Nucleated RBC %  0.0    /100WBC


 


Sodium    138  (135-145)  mmol/L


 


Potassium    3.6  (3.5-5.0)  mmol/L


 


Chloride    102  (101-111)  mmol/L


 


Carbon Dioxide    23  (21-32)  mmol/L


 


Anion Gap    13.0  (6-13)  


 


BUN    9  (6-20)  mg/dL


 


Creatinine    0.6  (0.4-1.0)  mg/dL


 


Estimated GFR (MDRD)    115  (>89)  


 


Glucose    90  ()  mg/dL


 


Calcium    9.2  (8.5-10.3)  mg/dL


 


Ur Specific Gravity   1.025   (1.002-1.030)  


 


Urine HCG, Qual   NEGATIVE   














  12/11/19 Range/Units





  14:14 


 


WBC  16.0 H  (4.8-10.8)  x10^3/uL


 


RBC  4.21  (4.20-5.40)  10^6/uL


 


Hgb  13.6  (12.0-16.0)  g/dL


 


Hct  40.7  (37.0-47.0)  %


 


MCV  96.7  (81.0-99.0)  fL


 


MCH  32.3 H  (27.0-31.0)  pg


 


MCHC  33.4  (32.0-36.0)  g/dL


 


RDW  12.1  (12.0-15.0)  %


 


Plt Count  298  (130-450)  10^3/uL


 


MPV  10.0  (7.9-10.8)  fL


 


Neut # (Auto)  13.2 H  (1.5-6.6)  10^3/uL


 


Lymph # (Auto)  1.7  (1.5-3.5)  10^3/uL


 


Mono # (Auto)  1.0  (0.0-1.0)  10^3/uL


 


Eos # (Auto)  0.1  (0.0-0.7)  10^3/uL


 


Baso # (Auto)  0.1  (0.0-0.1)  10^3/uL


 


Absolute Nucleated RBC  0.00  x10^3/uL


 


Nucleated RBC %  0.0  /100WBC


 


Sodium   (135-145)  mmol/L


 


Potassium   (3.5-5.0)  mmol/L


 


Chloride   (101-111)  mmol/L


 


Carbon Dioxide   (21-32)  mmol/L


 


Anion Gap   (6-13)  


 


BUN   (6-20)  mg/dL


 


Creatinine   (0.4-1.0)  mg/dL


 


Estimated GFR (MDRD)   (>89)  


 


Glucose   ()  mg/dL


 


Calcium   (8.5-10.3)  mg/dL


 


Ur Specific Gravity   (1.002-1.030)  


 


Urine HCG, Qual   














ABX Reporting


Has patient been on IV antibiotics over the past 48 hours?: Yes





Assessment/Plan





- Problem List


(1) Abscess or cellulitis of submandibular region


Impression: 


Postoperative day #1 for IND of abscess from right back molar/wisdom tooth.





Plan:


Continue pain management and start to transition to oral pain management


Encourage p.o. intake with soft foods


She is already drinking plenty of fluids and will stop IV hydration


Continue Unasyn and transition to p.o. Augmentin at discharge


Discharge decision to be made by surgery

## 2019-12-13 VITALS — DIASTOLIC BLOOD PRESSURE: 65 MMHG | SYSTOLIC BLOOD PRESSURE: 124 MMHG

## 2019-12-13 LAB
BASOPHILS NFR BLD AUTO: 0 10^3/UL (ref 0–0.1)
BASOPHILS NFR BLD AUTO: 0.4 %
EOSINOPHIL # BLD AUTO: 0.1 10^3/UL (ref 0–0.7)
EOSINOPHIL NFR BLD AUTO: 0.6 %
ERYTHROCYTE [DISTWIDTH] IN BLOOD BY AUTOMATED COUNT: 12.1 % (ref 12–15)
HGB UR QL STRIP: 11 G/DL (ref 12–16)
LYMPHOCYTES # SPEC AUTO: 2.6 10^3/UL (ref 1.5–3.5)
LYMPHOCYTES NFR BLD AUTO: 23.7 %
MCH RBC QN AUTO: 31.3 PG (ref 27–31)
MCHC RBC AUTO-ENTMCNC: 33.1 G/DL (ref 32–36)
MCV RBC AUTO: 94.3 FL (ref 81–99)
MONOCYTES # BLD AUTO: 0.8 10^3/UL (ref 0–1)
MONOCYTES NFR BLD AUTO: 7.5 %
NEUTROPHILS # BLD AUTO: 7.2 10^3/UL (ref 1.5–6.6)
NEUTROPHILS # SNV AUTO: 10.8 X10^3/UL (ref 4.8–10.8)
NEUTROPHILS NFR BLD AUTO: 67.2 %
PDW BLD AUTO: 9.9 FL (ref 7.9–10.8)
PLATELET # BLD: 269 10^3/UL (ref 130–450)
RBC MAR: 3.52 10^6/UL (ref 4.2–5.4)

## 2019-12-13 RX ADMIN — SODIUM CHLORIDE SCH MLS/HR: 9 INJECTION, SOLUTION INTRAVENOUS at 05:01

## 2019-12-13 RX ADMIN — ACETAMINOPHEN PRN MG: 325 TABLET ORAL at 07:53

## 2019-12-13 RX ADMIN — SODIUM CHLORIDE, PRESERVATIVE FREE SCH ML: 5 INJECTION INTRAVENOUS at 07:53

## 2019-12-13 RX ADMIN — Medication SCH CAP: at 07:53

## 2019-12-13 NOTE — PROVIDER PROGRESS NOTE
Subjective





- Prog Note Date


Prog Note Date: 12/13/19


Prog Note Time: 07:46





- Subjective


Pt reports feeling: Improved


Subjective: 





No events overnight.  Minimal pain medication needs.  Good PO intake.  Afebrile 

overnight.





Objective





- Vital Signs/Intake & Output


Intake & Output: 


                                 Intake & Output











 12/10/19 12/11/19 12/12/19 12/13/19





 23:59 23:59 23:59 23:59


 


Intake Total  5471.042 8114 550


 


Output Total   5600 700


 


Balance  1521.667 -2120 -150














- Objective


General Appearance: positive: No acute distress


Eyes Bilateral: positive: PERRL


ENT: positive: Other (VIDAL 15mm.  FOM s, nt, ne.  Uvula midline, no lateral 

pharyngeal swelling.  Moderate R face and neck swelling.  Skin soft, deep 

tissues indurated.   Drain intact.  Moderate serosanguinous drainage.)





- Lab Results


Fish Bones: 


                                 12/13/19 04:45





                                 12/11/19 14:14


Other Labs: 


                               Lab Results x24hrs











  12/13/19 Range/Units





  04:45 


 


WBC  10.8  (4.8-10.8)  x10^3/uL


 


RBC  3.52 L  (4.20-5.40)  10^6/uL


 


Hgb  11.0 L  (12.0-16.0)  g/dL


 


Hct  33.2 L  (37.0-47.0)  %


 


MCV  94.3  (81.0-99.0)  fL


 


MCH  31.3 H  (27.0-31.0)  pg


 


MCHC  33.1  (32.0-36.0)  g/dL


 


RDW  12.1  (12.0-15.0)  %


 


Plt Count  269  (130-450)  10^3/uL


 


MPV  9.9  (7.9-10.8)  fL


 


Neut # (Auto)  7.2 H  (1.5-6.6)  10^3/uL


 


Lymph # (Auto)  2.6  (1.5-3.5)  10^3/uL


 


Mono # (Auto)  0.8  (0.0-1.0)  10^3/uL


 


Eos # (Auto)  0.1  (0.0-0.7)  10^3/uL


 


Baso # (Auto)  0.0  (0.0-0.1)  10^3/uL


 


Absolute Nucleated RBC  0.00  x10^3/uL


 


Nucleated RBC %  0.0  /100WBC














Assessment/Plan





- Problem List


(1) Abscess or cellulitis of submandibular region


Impression: 


33 yo F POD #2 s/p I&D of the R submandibular, sublingual, and buccal spp w/ 

removal of tooth #32 following a normal postoperative course.  





Plan:  


  - OK for d/c from OMFS standpoint


  - Will keep in contact with her over the weekend via cell phone


  - f/u in clinic on Monday


  - Augmentin after d/c


  - encouraged good PO intake


  - reviewed jaw stretching exercises


  


Drain removed this am


Keep dressing on wound over the weekend.  Gauze and tape ok if she would like to

stop wearing the tubular bandage.





Appreciate IM assistance.  Please call [phone] w/ any questions.

## 2019-12-13 NOTE — DISCHARGE PLAN
Discharge Plan


Problem Reviewed?: Yes


Disposition: 01 Home, Self Care


Condition: Stable


Prescriptions: 


oxyCODONE [Roxicodone] 5 mg PO Q6HR PRN #12 tablet


 PRN Reason: Pain


Amox/Clav 875/125 [Augmentin 875/125] 1 tab PO BID 5 Days #10 tablet


Diet: Regular


Activity Restrictions: Activity as Tolerated


Shower Restrictions: No


Driving Restrictions: No


Instruction Topics:  Amoxicillin Clavulanic Acid tablets, Oxycodone tablets or 

capsules, ED Abscess IandD


Health Concerns: 


You were seen in the hospital after there was concern for an infection and fluid

collection near your right mandibular area.  The oral surgeon saw you and you 

underwent incision and drainage with removal of one of your teeth 2 days ago.  

You have been on IV antibiotics with improvement in your swelling.  You are also

now able to tolerate oral intake.  You are now stable for discharge and you need

to continue to take antibiotics and follow-up with the oral surgeon.


Plan of Treatment: 


Please take Augmentin 875/125mg Twice a day for 5 more days.


Your prescribed oxycodone to take as needed for pain. You may also take Tylenol 

as needed for pain control.





You may keep the dressing on the wound over the weekend.  If you would like to 

stop wearing the bandage, you can use gauze and tape.





Please follow-up with the oral surgeon (Dr. Linda) next Monday as scheduled.


Care Goals: 


If you develop worsening swelling, pain, fever then please return to the 

emergency department.


Assessment: 


Patient expressed understanding of the treatment plan.


No Smoking: If you smoke, Please STOP!  Call [phone] for help.

## 2019-12-13 NOTE — DISCHARGE SUMMARY
Discharge Summary


Admit Date: 12/11/19


Discharge Date: 12/13/19


Discharging Provider: Lul Merchant


Code Status: Attempt Resuscitation


Condition at Discharge: Stable


Discharge Disposition: 01 Home, Self Care





- DIAGNOSES


Admission Diagnoses: 


Abscess or cellulitis of submandibular region


Preop evaluation





Discharge Diagnoses with Status of Each Condition: 


Abscess of submandibular region - improving. She underwent incision and drainage

of abscess from the right submandibular region and removal of tooth #32 on 

12/11/19.  Was treated with IV Unasyn while inpatient and discharged on oral 

Augmentin.  She will follow-up with Dr. Linda on Monday.








- HPI


History of Present Illness: 


H&P per Dr. Lopez on 12/11/19:





She is a 34-year-old white female who has no major medical illnesses the 

presents to our emergency room because of jaw pain and swelling.She says that 

she has this intermittently because of a wisdom tooth on the right side of her 

jaw that will have growth spurts.  Usually pain is transitory, fleeting, and 

does not last for very long.  And only her gum will get swollen.





This time the swelling has extended into her jaw and into her face and down her 

neck and it is scaring her.She went to go see the dentist, who referred her to 

the emergency room for imaging studies.  She has been identified as having a 

tooth abscess extending into the jaw.On review of systems she has no problems 

with chest pain, previous history of valvular heart disease, arrhythmias, 

pneumonias or bronchitis, urgency, frequency, dysuria.She does not take any 

medication on a regular basis.She has an IUD for birth control.





She does not have a fever but white cell count is 16,000.Soft tissue neck CT 

shows apparent a couple abscess involving the right mandibular posterior 

tricuspid tooth.  Medial cortical erosion seen with adjacent abscess present to 

the inferior of the angle of the mandible measuring 15 x 28 x 24 mm.  Medrano

rrounding fascial plane edema and mass-effect is present.








- CONSULTS | PROCEDURES


Consultations: Oral Maxillofacial Surgery


Procedures: 


Incision and drainage of right submandibular region with removal of tooth #32 on

12/11/19.








- HOSPITAL COURSE


Hospital Course: 


Admitted for right facial swelling with concern for a submandibular abscess.  

She went to the OR on 11 December with Dr. Linda of oral maxillofacial surgery. 

She underwent incision and drainage and removal of tooth #32.  She treated with 

Unasyn IV infusion to oral Augmentin.  She is able to tolerate a diet.  She was 

discharged home and will follow up with Dr. Linda on Monday.








- ALLERGIES


Allergies/Adverse Reactions: 


                                    Allergies











Allergy/AdvReac Type Severity Reaction Status Date / Time


 


No Known Drug Allergies Allergy   Verified 12/11/19 14:14














- MEDICATIONS


Home Medications: 


                                Ambulatory Orders











 Medication  Instructions  Recorded  Confirmed


 


Flaxseed Oil 1,000 mg PO DAILY 12/12/19 12/12/19


 


Loratadine [Claritin] 10 mg PO DAILY 12/12/19 12/12/19


 


Pnv No.121/Iron/Folic Acid 1 tab PO DAILY 12/12/19 12/12/19





[Prenatal Multivitamin Tablet]   


 


Amox/Clav 875/125 [Augmentin 1 tab PO BID 5 Days #10 tablet 12/13/19 





875/125]   


 


oxyCODONE [Roxicodone] 5 mg PO Q6HR PRN #12 tablet 12/13/19 














- PHYSICAL EXAM AT DISCHARGE


General Appearance: positive: No acute distress, Alert


Eyes Bilateral: positive: Normal inspection


ENT: positive: No signs of dehydration.  negative: Pharyngeal erythema


Neck: positive: Other (She has right sided neck swelling that is tender to 

palpation.  No erythema.  Small incision in place over the neck with 

serosanguineous drainage.)


Respiratory: positive: No respiratory distress.  negative: Wheezes, Rales, 

Rhonchi


Cardiovascular: positive: Regular rate & rhythm, No murmur.  negative: Systolic 

murmur, Diastolic murmur


Abdomen: positive: Non-tender, No distention.  negative: Tenderness


Skin: positive: No rash, Warm, Dry


Extremities: positive: Full ROM, No pedal edema


Neurologic/Psychiatric: positive: Oriented x3.  negative: Disoriented to person,

 Disoriented to place, Disoriented to time





- LABS


Result Diagrams: 


                                 12/13/19 04:45





                                 12/11/19 14:14





- DIAGNOSTIC IMAGING


Diagnostic Imaging Results: Final report reviewed





- FOLLOW UP


Follow Up: 


Follow-up with Dr. Linda on 16th of December. 








- TIME SPENT


Time Spent in Discharge (Minutes): 35

## 2024-01-03 NOTE — CONSULTATION NOTE
Referring Provider


Name of Referring Provider:: Cristina Lopez


Consult Date: 12/11/19





Chief Complaint





- Chief Complaint


Chief Complaint: Right neck swelling





History of Present Illness





- Admitted From


Admitted From:: ER





- History Obtained From


Records Reviewed: Yes


History obtained from: Patient





- History of Present Illness


HPI Comment/Other: 





3 day h/o swelling of the R face, worsening daily.  Prior h/o pain associated w/

tooth #32.  Denies dysphagia, dyspnea.  Endorses fever, globus, severe trismus, 

pain of the right mandible and neck.  Accompanied by mother.  CT max/face 

demonstrated 3 cm abscess and [facility] was consulted for evaluation and management. 





History





- Past Medical History


Cardiovascular: reports: None


Respiratory: reports: None


Neuro: reports: None


Endocrine/Autoimmune: reports: None


GI: reports: None


: reports: None


Musculoskeletal: reports: None





- Past Surgical History


Ortho: reports: Other (wrist)





Meds/Allgy





- Allergies


Allergies/Adverse Reactions: 


                                    Allergies











Allergy/AdvReac Type Severity Reaction Status Date / Time


 


No Known Drug Allergies Allergy   Verified 12/11/19 14:14














Review of Systems





- Other Findings


Other Findings: 





A 14 point ROS was completed and found to be negative except as noted above in 

HPI.  





Exam





- Vital Signs


Vital Signs: 





                                Vital Signs x48h











  Temp Pulse Resp BP Pulse Ox


 


 12/11/19 15:59   80  18  133/71 H  100


 


 12/11/19 14:14  37.2 C  88  18  141/75 H  100














- Physical Exam


General Appearance: positive: No acute distress


Eyes Bilateral: positive: PERRL, EOMI


ENT: positive: Other (VIDAL 9mm.  Uvula, pharynx could not be visualized because 

of trismus.  TTP of the R mandibular vestibule.  No active drainage.   There is 

moderate swelling of the R buccal space which is indurated and ttp.  The 

swelling extends inferiorly to the thyroid cartilage.  The skin over the 

swelling is soft, red, and tender.  There is no breakdown of the overlying skin.

  The inferior border of the right mandible cannot be palpated. The floor of the

mouth is elevated and tender on the right, but there is no appreciable elevation

of the tongue. The patient is handling their secretions well and laying supine 

comfortably.)


Neck: positive: Other (Swelling as noted above.  Lymphadenopathy noted in the 

left neck.)


Respiratory: positive: No respiratory distress, Breath sounds nml


Cardiovascular: positive: Regular rate & rhythm, No murmur


Peripheral Pulses: positive: 2+


Abdomen: positive: Non-tender, No distention


Skin: positive: Other (clean, dry, and intact with erythema and tenderness of 

the right neck as noted above)


Extremities: positive: Full ROM, Nml appearance


Neurologic/Psychiatric: positive: Oriented x3, CN's nml (2-12)





Conclusion/Plan





- Diagnosis


Diagnosis: Right submandibular, sublingual, and buccal spp abscess 2/2 necrotic 

tooth #32





- Plan


Plan: 





We anticipate incision and drainage of the right submandibular, sublingual, and 

buccal spaces with removal of tooth #32 tonight in the [facility].





  - NPO until surgery, soft diet after surgery


  - continue Unasyn while in house, Augmentin for seven additional days after 

d/c


  - Elevate HOB 30 degrees


  - encourage ambulation


  - cbc w/ dif daily am





 


Drain care:


Cover drain w/ gauze.  Use tubular dressing around neck to secure the gauze.  

Change gauze at least twice daily, more often if necessary.  The 1/4 inch 

penrose drain in the neck is secured with suture.  Needs to be removed prior to 

discharge.  Have the patient remove the dressing and shower daily in the am, 

then place a new dressing.





Appreciate IM assistance in the care of Summer.  Please call with any questions:

 [phone]





- Lab Results


Lab results reviewed: Yes


Fish Bones: 


                                 12/11/19 14:14





                                 12/11/19 14:14
Patient